# Patient Record
Sex: FEMALE | Race: WHITE | NOT HISPANIC OR LATINO | URBAN - METROPOLITAN AREA
[De-identification: names, ages, dates, MRNs, and addresses within clinical notes are randomized per-mention and may not be internally consistent; named-entity substitution may affect disease eponyms.]

---

## 2017-06-22 ENCOUNTER — OFFICE VISIT - HEALTHEAST (OUTPATIENT)
Dept: SURGERY | Facility: CLINIC | Age: 42
End: 2017-06-22

## 2017-06-22 DIAGNOSIS — R74.8 ABNORMAL LIVER ENZYMES: ICD-10-CM

## 2017-06-22 DIAGNOSIS — K80.20 CALCULUS OF GALLBLADDER WITHOUT CHOLECYSTITIS WITHOUT OBSTRUCTION: ICD-10-CM

## 2017-06-22 RX ORDER — MULTIVITAMIN WITH IRON
1 TABLET ORAL DAILY
Status: SHIPPED | COMMUNITY
Start: 2017-06-22

## 2017-06-22 ASSESSMENT — MIFFLIN-ST. JEOR: SCORE: 1478.69

## 2017-06-29 ENCOUNTER — ANESTHESIA - HEALTHEAST (OUTPATIENT)
Dept: SURGERY | Facility: HOSPITAL | Age: 42
End: 2017-06-29

## 2017-06-29 ASSESSMENT — MIFFLIN-ST. JEOR: SCORE: 1437.86

## 2017-06-30 ENCOUNTER — SURGERY - HEALTHEAST (OUTPATIENT)
Dept: SURGERY | Facility: HOSPITAL | Age: 42
End: 2017-06-30

## 2017-07-03 ENCOUNTER — COMMUNICATION - HEALTHEAST (OUTPATIENT)
Dept: SURGERY | Facility: CLINIC | Age: 42
End: 2017-07-03

## 2017-07-14 ENCOUNTER — OFFICE VISIT - HEALTHEAST (OUTPATIENT)
Dept: SURGERY | Facility: CLINIC | Age: 42
End: 2017-07-14

## 2017-07-14 DIAGNOSIS — Z98.890 POST-OPERATIVE STATE: ICD-10-CM

## 2017-07-14 RX ORDER — FLUOCINONIDE 0.5 MG/G
CREAM TOPICAL
Status: SHIPPED | COMMUNITY
Start: 2013-10-01

## 2021-05-28 ENCOUNTER — RECORDS - HEALTHEAST (OUTPATIENT)
Dept: ADMINISTRATIVE | Facility: CLINIC | Age: 46
End: 2021-05-28

## 2021-05-31 VITALS — WEIGHT: 170 LBS | BODY MASS INDEX: 27.32 KG/M2 | HEIGHT: 66 IN

## 2021-05-31 VITALS — WEIGHT: 179 LBS | BODY MASS INDEX: 28.77 KG/M2 | HEIGHT: 66 IN

## 2021-06-11 NOTE — ANESTHESIA POSTPROCEDURE EVALUATION
Patient: Rebekah Schulte  LAPAROSCOPIC CHOLECYSTECTOMY , WITH CHOLANGIOGRAMS  Anesthesia type: general    Patient location: PACU  Last vitals:   Vitals:    06/30/17 1430   BP: 106/63   Pulse: 83   Resp: 16   Temp:    SpO2: 97%     Post vital signs: stable  Level of consciousness: awake and responds to simple questions  Post-anesthesia pain: pain controlled  Post-anesthesia nausea and vomiting: no  Pulmonary: unassisted, return to baseline  Cardiovascular: stable and blood pressure at baseline  Hydration: adequate  Anesthetic events: no    QCDR Measures:  ASA# 11 - Damaris-op Cardiac Arrest: ASA11B - Patient did NOT experience unanticipated cardiac arrest  ASA# 12 - [place]aris-op Mortality Rate: ASA12B - Patient did NOT die  ASA# 13 - PACU Re-Intubation Rate: ASA13B - Patient did NOT require a new airway mgmt  ASA# 10 - Composite Anes Safety: ASA10A - No serious adverse event  ASA# 38 - New Corneal Injury: ASA38A - No new exposure keratitis or corneal abrasion in PACU       Additional Notes:

## 2021-06-11 NOTE — PROGRESS NOTES
HPI: Pt is here for follow up of a lap aric.   she is doing well.  Pain is well controlled.  No difficulties with the surgical wound/wounds.  she is eating well and denies fever and chills.   She does feel slightly bloated but denies any nausea and is tolerating PO intake.       /73 (Patient Site: Right Arm, Patient Position: Sitting, Cuff Size: Adult Regular)  Pulse 66  SpO2 96%    EXAM:  GENERAL:Appears well  ABDOMEN:  Soft, +BS  SURGICAL WOUNDS:  Incisions healing well, no enduration or drainage.    Pathology benign.     Assessment/Plan: .   Reassured her that her bloating should continue to improve and appetite will increase as she continued to heal after surgery.   If she does not feel that she is improving in this regard over the next 1-2 weeks,   Doing well after surgery and should follow up as needed.      Ghulam Flores, [facility] Department of Surgery

## 2021-06-11 NOTE — ANESTHESIA PREPROCEDURE EVALUATION
Anesthesia Evaluation      Patient summary reviewed     Airway   Mallampati: II  Neck ROM: full   Pulmonary - negative ROS and normal exam                          Cardiovascular - negative ROS and normal exam   Neuro/Psych - negative ROS     Endo/Other - negative ROS      GI/Hepatic/Renal - negative ROS           Dental - normal exam                        Anesthesia Plan  Planned anesthetic: general endotracheal    ASA 2   Induction: intravenous   Anesthetic plan and risks discussed with: patient    Post-op plan: routine recovery

## 2021-06-11 NOTE — ANESTHESIA CARE TRANSFER NOTE
Last vitals: 97.7 Temp  Vitals:    06/30/17 1250   BP: 116/60   Pulse: 78   Resp: 15   Temp:    SpO2: 100%     Patient's level of consciousness is drowsy  Spontaneous respirations: yes  Maintains airway independently: yes  Dentition unchanged: yes  Oropharynx: oropharynx clear of all foreign objects    QCDR Measures:  ASA# 20 - Surgical Safety Checklist: ASA20A - Safety Checks Done  PQRS# 430 - Adult PONV Prevention: 4558F - Pt received => 2 anti-emetic agents (different classes) preop & intraop  ASA# 8 - Peds PONV Prevention: NA - Not pediatric patient, not GA or 2 or more risk factors NOT present  PQRS# 424 - Damaris-op Temp Management: 4559F - At least one body temp DOCUMENTED => 35.5C or 95.9F within required timeframe  PQRS# 426 - PACU Transfer Protocol: - Transfer of care checklist used  ASA# 14 - Acute Post-op Pain: ASA14B - Patient did NOT experience pain >= 7 out of 10

## 2021-06-11 NOTE — PROGRESS NOTES
HPI: Rebekah Schulte is a 42 y.o. female referred to see me by Vivienne Jewell MD for right upper quadrant pain.  She presented to the [facility] emergency department the evening of June 20 with a sudden episode of epigastric and chest pain.  It was primarily epigastric in origin, but had some radiation into her chest and into her back.  Had some mild associated nausea, with no other constitutional symptoms present.  She underwent full evaluation including CTA of the chest abdomen and pelvis, and ultrasound of the abdomen.  The CTA was negative for any discrete pathology.  The ultrasound was notable for positive sonographic Tilley sign, as well as stones within the gallbladder.  She did have some mild elevation of her LFTs, and a notable elevation in her total bilirubin.  At the time it was not felt that she had active cholecystitis or choledocholithiasis, and she was therefore discharged with quick follow-up with us.     She denies any nausea, vomiting, fevers, chills, juandice or any other symptoms at present.   Since discharge from the emergency department, she reports there is still some mild pain, predominantly right-sided but not nearly as severe as what she had at the time of initial presentation.    Allergies:Review of patient's allergies indicates no known allergies.    History reviewed. No pertinent past medical history.    History reviewed. No pertinent surgical history.    CURRENT MEDS:    Current Outpatient Prescriptions:      magnesium 250 mg Tab, Take by mouth. Take 1 tablet once a day, Disp: , Rfl:      MULTIVITAMIN ORAL, Take by mouth. Take 1 tablet once a day, Disp: , Rfl:      ondansetron (ZOFRAN ODT) 4 MG disintegrating tablet, Take 1 tablet (4 mg total) by mouth every 8 (eight) hours as needed for nausea., Disp: 20 tablet, Rfl: 0     HYDROcodone-acetaminophen 5-325 mg per tablet, Take 1 tablet by mouth every 4 (four) hours as needed for pain., Disp: 10 tablet, Rfl: 0    Family History   Problem  "Relation Age of Onset     No Medical Problems Mother      No Medical Problems Father      No Medical Problems Sister      No Medical Problems Brother      No Medical Problems Sister      No Medical Problems Brother         reports that she has quit smoking. She has never used smokeless tobacco. She reports that she drinks alcohol. She reports that she does not use illicit drugs.    Review of Systems:  The 10 point review of systems  is within normal limits except for as mentioned above in the HPI.  General ROS: No complaints or constitutional symptoms  Skin: No complaints or symptoms   Hematologic/Lymphatic: No symptoms or complaints  Psychiatric: No symptoms or complaints  Endocrine: No excessive fatigue, no hypermetabolic symptoms reported  Respiratory ROS: no cough, shortness of breath, or wheezing  Cardiovascular ROS: no chest pain or dyspnea on exertion  Gastrointestinal ROS: As per HPI  Musculoskeletal ROS: no recent injuries reported  Neurological ROS: no focal neurologic defects reported.        /77 (Patient Site: Right Arm, Patient Position: Sitting, Cuff Size: Adult Regular)  Pulse 62  Ht 5' 6\" (1.676 m)  Wt 179 lb (81.2 kg)  SpO2 95%  BMI 28.89 kg/m2  Body mass index is 28.89 kg/(m^2).    EXAM:  General : Alert, cooperative, appears stated age   Skin: Skin color, texture, turgor normal, no rashes or lesions   Lymphatic: No obvious adenopathy, no swelling   Eyes: No scleral icterus, pupils equal  HENT: no traumatic injury to the head or face, no gross abnormalities  Lungs: Normal respiratory effort, breath sounds equal bilaterally  Heart: Regular rate and rhythm  Abdomen: Soft, nondistended, tender to palpation in the right upper quadrant, mild in severity  Musculoskeletal: No obvious swelling  Neurologic: Grossly intact      LABS:  Lab Results   Component Value Date    WBC 9.8 06/20/2017    HGB 14.7 06/20/2017    HCT 43.0 06/20/2017    MCV 85 06/20/2017     06/20/2017     INR/Prothrombin " Time    Results from last 7 days  Lab Units 06/20/17  1745   LN-SODIUM mmol/L 137   LN-POTASSIUM mmol/L 3.1*   LN-CHLORIDE mmol/L 102   LN-CO2 mmol/L 25   LN-BLOOD UREA NITROGEN mg/dL 13   LN-CREATININE mg/dL 0.87   LN-CALCIUM mg/dL 9.5     Lab Results   Component Value Date    ALT 57 (H) 06/20/2017    AST 61 (H) 06/20/2017    ALKPHOS 92 06/20/2017    BILITOT 1.8 (H) 06/20/2017       IMAGES:   Relevant images were reviewed and discussed with the patient.  Notable findings were: The presence of multiple gallstones present in the gallbladder on ultrasound obtained June 20, 2017.  Also notable is the positive sonographic Tilley sign.  Common bile duct was noted to be normal caliber without dilation.  No evidence of cholecystitis or pericholecystic fluid.    CTA obtained June 20, 2017 reviewed, no abnormal findings present.    Assessment/Plan:   1. Calculus of gallbladder without cholecystitis without obstruction    2. Abnormal liver enzymes        Rebekah Schulte is a 42 y.o. female with signs and symptoms consistent with symptomatic biliary colic.  With the elevation of her LFTs, in particular the total bilirubin seen 3 days ago, and there is at least a suspicion that she may have passed a common bile duct stone.  I have explained the pathophysiology of gall stone formation, cholecystitis, and choledocholithiasis in detail as well as the surgical versus non-operative management strategies.      The risks of surgery were discussed in detail which include, but are not limited to, bleeding, infection, injury to surrounding structures, the need to convert to an open procedure, blood clots, stroke, heart attack and death.  Specifically we discussed the risk of damage to the common bile duct and hepatic vessels, and the complications that may arise from damage to these structures.  Additionally, the risks of non operative management were discussed which include, but are not limited to, worsening infection, increased pain,  sepsis and death.     She understands everything which was discussed and has consented to proceed with a laparoscopic cholecystectomy with intraoperative cholangiogram.  She would like to think about surgery but more before picking a surgery date.  She was given our contact information should she elect to pursue surgery or if she had any further questions or concerns.      Loc Holm M.D.   [phone]  [facility] Department of Surgery